# Patient Record
Sex: FEMALE | Race: WHITE | NOT HISPANIC OR LATINO | Employment: UNEMPLOYED | URBAN - METROPOLITAN AREA
[De-identification: names, ages, dates, MRNs, and addresses within clinical notes are randomized per-mention and may not be internally consistent; named-entity substitution may affect disease eponyms.]

---

## 2017-01-03 RX ORDER — DIPHENHYDRAMINE HCL 25 MG
CAPSULE ORAL
Qty: 60 CAP | Refills: 0 | Status: SHIPPED | OUTPATIENT
Start: 2017-01-03

## 2017-01-03 NOTE — TELEPHONE ENCOUNTER
Was the patient seen in the last year in this department? Yes    Last seen: 12/16/16 by Dr. Duarte  Next appt: NONE      Does patient have an active prescription for medications requested? No     Received Request Via: Pharmacy

## 2019-06-26 ENCOUNTER — TELEPHONE (OUTPATIENT)
Dept: MEDICAL GROUP | Facility: MEDICAL CENTER | Age: 59
End: 2019-06-26

## 2019-06-26 NOTE — TELEPHONE ENCOUNTER
Left message with patient's friend to call me back to discuss the no show.  We will send letter about first no show to appointment today 6/26/19.

## 2019-09-12 ENCOUNTER — HOSPITAL ENCOUNTER (EMERGENCY)
Facility: MEDICAL CENTER | Age: 59
End: 2019-09-12
Attending: EMERGENCY MEDICINE
Payer: MEDICAID

## 2019-09-12 VITALS
BODY MASS INDEX: 22.31 KG/M2 | DIASTOLIC BLOOD PRESSURE: 88 MMHG | WEIGHT: 159.39 LBS | TEMPERATURE: 98.2 F | RESPIRATION RATE: 16 BRPM | HEIGHT: 71 IN | HEART RATE: 85 BPM | SYSTOLIC BLOOD PRESSURE: 124 MMHG | OXYGEN SATURATION: 96 %

## 2019-09-12 DIAGNOSIS — B86 SCABIES: ICD-10-CM

## 2019-09-12 PROCEDURE — 99283 EMERGENCY DEPT VISIT LOW MDM: CPT

## 2019-09-12 PROCEDURE — 700111 HCHG RX REV CODE 636 W/ 250 OVERRIDE (IP): Performed by: EMERGENCY MEDICINE

## 2019-09-12 PROCEDURE — 96372 THER/PROPH/DIAG INJ SC/IM: CPT

## 2019-09-12 RX ORDER — TRIAMCINOLONE ACETONIDE 1 MG/G
OINTMENT TOPICAL
Qty: 30 G | Refills: 0 | Status: SHIPPED | OUTPATIENT
Start: 2019-09-12

## 2019-09-12 RX ORDER — PERMETHRIN 50 MG/G
1 CREAM TOPICAL ONCE
Qty: 2 TUBE | Refills: 0 | Status: SHIPPED | OUTPATIENT
Start: 2019-09-12 | End: 2019-09-12

## 2019-09-12 RX ORDER — KETOROLAC TROMETHAMINE 30 MG/ML
30 INJECTION, SOLUTION INTRAMUSCULAR; INTRAVENOUS ONCE
Status: COMPLETED | OUTPATIENT
Start: 2019-09-12 | End: 2019-09-12

## 2019-09-12 RX ADMIN — KETOROLAC TROMETHAMINE 30 MG: 30 INJECTION, SOLUTION INTRAMUSCULAR at 11:48

## 2019-09-12 ASSESSMENT — LIFESTYLE VARIABLES: DO YOU DRINK ALCOHOL: NO

## 2019-09-12 NOTE — ED NOTES
Pt discharged home. Explained discharge and medication instructions. Questions and comments addressed. Pt verbalized understanding of instructions. Pt advised to follow-up with PCP as needed or return to ED for any new or worsening of symptoms. Pt is ambulating well and steady on feet. VS stable. Pt's spouse at bedside and will be driving pt home.

## 2019-09-12 NOTE — ED PROVIDER NOTES
ED Provider Note    CHIEF COMPLAINT   Chief Complaint   Patient presents with   • Rash     x3 days        HPI   Tamiko Renae is a 58 y.o. female who presents complaining of a pruritic rash for 3 days on the hands arms feet somewhat on the torso and on the neck with one lesion on the face.  She went to [facility] and was prescribed doxycycline but this caused nausea and vomiting and she stopped it after 2 days.  She has itching.  Benadryl is not effective.  No ill contacts or known scabies.  No witnessed insect bites.    REVIEW OF SYSTEMS   Pertinent positives include: Itchy rash with excoriations.  Pertinent negatives include: Fever, flulike symptoms, nausea, body aches.     PAST MEDICAL HISTORY   Past Medical History:   Diagnosis Date   • Allergic conjunctivitis    • Anxiety and depression    • Bilateral tinnitus    • Bronchitis    • Chronic bronchitis (CMS-HCC)    • Chronic cough    • Chronic LBP    • Dental caries    • Dental disorder    • Encephalomalacia    • GERD (gastroesophageal reflux disease)    • H/O traumatic brain injury    • Heart burn    • Hemoptysis    • Hot flashes    • Hypertension    • Indigestion    • Knee pain    • Leg edema, right    • Leg pain, right    • Low back pain    • Memory loss    • Migraine    • Myocardial infarction (CMS-HCC)     small, at home, [place] sx, 3 years ago   • Numbness of hand    • Other specified disorder of intestines    • Other specified symptom associated with female genital organs    • Pectus excavatum    • Pelvic mass dx fall 2009   • Pneumonia    • Psychiatric problem    • Sciatica    • Sciatica of right side    • TMJ arthralgia    • Unspecified urinary incontinence    • Urgency of urination    • Vertigo    • Viral upper respiratory infection    • Vision disturbance    • Visual disturbance        CURRENT MEDICATIONS   Home Medications     Reviewed by Naz Velasquez R.N. (Registered Nurse) on 09/12/19 at 1044  Med List Status: Not Addressed  "  Medication Last Dose Status   acetaminophen-codeine #3 (TYLENOL #3) 300-30 MG Tab  Active   acetaminophen/caffeine/butalbital 300-40-50 mg (FIORICET) -40 MG Cap capsule  Active   albuterol (PROVENTIL) 2.5mg/3ml Nebu Soln solution for nebulization  Active   amitriptyline (ELAVIL) 50 MG Tab  Active   benztropine (COGENTIN) 2 MG Tab  Active   budesonide-formoterol (SYMBICORT) 160-4.5 MCG/ACT Aerosol  Active   cyclobenzaprine (FLEXERIL) 10 MG Tab  Active   diphenhydrAMINE (BENADRYL) 25 MG capsule  Active   diphenhydrAMINE (BENADRYL) 25 MG Tab  Active   esomeprazole (NEXIUM) 20 MG capsule  Active   gabapentin (NEURONTIN) 100 MG Cap  Active   gabapentin (NEURONTIN) 300 MG Cap  Active   guaifenesin-codeine (ROBITUSSIN AC) Solution oral solution  Active   lorazepam (ATIVAN) 0.5 MG TABS  Active   NON SPECIFIED  Active   quetiapine (SEROQUEL) 25 MG Tab  Active   quetiapine (SEROQUEL) 400 MG tablet  Active   sertraline (ZOLOFT) 25 MG tablet  Active   VENTOLIN  (90 BASE) MCG/ACT Aero Soln inhalation aerosol  Active                ALLERGIES   Allergies   Allergen Reactions   • Sulfa Drugs Anaphylaxis   • Sumatriptan Succinate Anaphylaxis   • Zoloft Anxiety     Will not take   • Amoxicillin Rash   • Ampicillin Rash   • Ciprofloxacin      Nausea/vomiting   • Erythromycin Vomiting and Swelling   • Imitrex [Sumatriptan]    • Morphine Anxiety   • Spiriva    • Tetracycline Vomiting       PHYSICAL EXAM  VITAL SIGNS: /90   Pulse 89   Temp 36.8 °C (98.2 °F) (Temporal)   Resp 20   Ht 1.803 m (5' 11\")   Wt 72.3 kg (159 lb 6.3 oz)   LMP 11/23/2010   SpO2 98%   BMI 22.23 kg/m²   Constitutional: Well developed, Well nourished, well-appearing.   HENT: Atraumatic.  Respiratory: Rate and excursion normal.  Musculoskeletal: No bony deformities.  Skin: Multiple small erythematous papules with some score excoriations.  No linear alignment but intertriginous area affected.  Neurologic: Nonfocal.     COURSE & MEDICAL " DECISION MAKING  Well-appearing patient presents with possible scabies without evidence of septic uvulitis.  Contact dermatitis is less likely.    PLAN:   New Prescriptions    PERMETHRIN (ELIMITE) 5 % CREAM    Apply 1 Tube to affected area(s) Once for 1 dose. Repeat in one week    TRIAMCINOLONE ACETONIDE (KENALOG) 0.1 % OINTMENT    Apply small amount to rash TID, sparing face and genitals.     Continue Benadryl for itch  Avoid scratching  Scabies handout given    your doctor if not better one week              CONDITION: good    FINAL IMPRESSION  1. Scabies            Electronically signed by: Orlin Arias, 9/12/2019 11:24 AM

## 2019-09-12 NOTE — ED NOTES
"Pt ambulates to triage, extremely talk a tive, with c/c rash x3 days.  Pt states she was seen at [facility]- \"but I can't remember what they did for me or gave to me.\"  A&ox4.  Pt to lobby & advised to inform RN of any changes.   "

## 2019-09-12 NOTE — DISCHARGE INSTRUCTIONS
You may have scabies.  Use permethrin cream once today and then again in 1 week.  Continue Benadryl for itch.  Do not scratch.  Take triamcinolone for itching.  Wash your bedding and towels.  See your doctor if not better next week